# Patient Record
Sex: FEMALE | Race: WHITE | Employment: FULL TIME | URBAN - METROPOLITAN AREA
[De-identification: names, ages, dates, MRNs, and addresses within clinical notes are randomized per-mention and may not be internally consistent; named-entity substitution may affect disease eponyms.]

---

## 2022-04-06 ENCOUNTER — APPOINTMENT (OUTPATIENT)
Dept: ULTRASOUND IMAGING | Age: 37
DRG: 283 | End: 2022-04-06
Attending: EMERGENCY MEDICINE
Payer: MEDICAID

## 2022-04-06 ENCOUNTER — HOSPITAL ENCOUNTER (INPATIENT)
Age: 37
LOS: 2 days | Discharge: HOME | DRG: 283 | End: 2022-04-08
Attending: EMERGENCY MEDICINE | Admitting: FAMILY MEDICINE
Payer: MEDICAID

## 2022-04-06 DIAGNOSIS — R74.01 TRANSAMINITIS: Primary | ICD-10-CM

## 2022-04-06 DIAGNOSIS — F11.93 HEROIN WITHDRAWAL (HCC): ICD-10-CM

## 2022-04-06 DIAGNOSIS — R17 ELEVATED BILIRUBIN: ICD-10-CM

## 2022-04-06 DIAGNOSIS — R10.84 ABDOMINAL PAIN, GENERALIZED: ICD-10-CM

## 2022-04-06 DIAGNOSIS — F19.90 IV DRUG USER: ICD-10-CM

## 2022-04-06 PROBLEM — R10.9 ABDOMINAL PAIN: Status: ACTIVE | Noted: 2022-04-06

## 2022-04-06 LAB
ALBUMIN SERPL-MCNC: 3 G/DL (ref 3.5–5)
ALBUMIN/GLOB SERPL: 0.6 {RATIO} (ref 1.1–2.2)
ALP SERPL-CCNC: 275 U/L (ref 45–117)
ALT SERPL-CCNC: 1889 U/L (ref 12–78)
ANION GAP SERPL CALC-SCNC: 5 MMOL/L (ref 5–15)
APPEARANCE UR: ABNORMAL
AST SERPL-CCNC: 1450 U/L (ref 15–37)
BACTERIA URNS QL MICRO: ABNORMAL /HPF
BASOPHILS # BLD: 0 K/UL (ref 0–0.1)
BASOPHILS NFR BLD: 0 % (ref 0–1)
BILIRUB SERPL-MCNC: 6.5 MG/DL (ref 0.2–1)
BILIRUB UR QL CFM: POSITIVE
BUN SERPL-MCNC: 8 MG/DL (ref 6–20)
BUN/CREAT SERPL: 10 (ref 12–20)
CALCIUM SERPL-MCNC: 8.9 MG/DL (ref 8.5–10.1)
CAOX CRY URNS QL MICRO: ABNORMAL
CHLORIDE SERPL-SCNC: 107 MMOL/L (ref 97–108)
CO2 SERPL-SCNC: 27 MMOL/L (ref 21–32)
COLOR UR: ABNORMAL
CREAT SERPL-MCNC: 0.8 MG/DL (ref 0.55–1.02)
DIFFERENTIAL METHOD BLD: ABNORMAL
EOSINOPHIL # BLD: 0 K/UL (ref 0–0.4)
EOSINOPHIL NFR BLD: 0 % (ref 0–7)
EPITH CASTS URNS QL MICRO: ABNORMAL /LPF
ERYTHROCYTE [DISTWIDTH] IN BLOOD BY AUTOMATED COUNT: 16.5 % (ref 11.5–14.5)
GLOBULIN SER CALC-MCNC: 4.9 G/DL (ref 2–4)
GLUCOSE SERPL-MCNC: 137 MG/DL (ref 65–100)
GLUCOSE UR STRIP.AUTO-MCNC: NEGATIVE MG/DL
HAV IGM SER QL: REACTIVE
HBV CORE IGM SER QL: NONREACTIVE
HBV SURFACE AG SER QL: <0.1 INDEX
HBV SURFACE AG SER QL: NEGATIVE
HCG UR QL: NEGATIVE
HCG UR QL: NEGATIVE
HCT VFR BLD AUTO: 41.8 % (ref 35–47)
HCV AB SER IA-ACNC: >11 INDEX
HCV AB SERPL QL IA: REACTIVE
HGB BLD-MCNC: 14 G/DL (ref 11.5–16)
HGB UR QL STRIP: NEGATIVE
IMM GRANULOCYTES # BLD AUTO: 0 K/UL (ref 0–0.04)
IMM GRANULOCYTES NFR BLD AUTO: 0 % (ref 0–0.5)
KETONES UR QL STRIP.AUTO: NEGATIVE MG/DL
LEUKOCYTE ESTERASE UR QL STRIP.AUTO: ABNORMAL
LIPASE SERPL-CCNC: 133 U/L (ref 73–393)
LYMPHOCYTES # BLD: 1.1 K/UL (ref 0.8–3.5)
LYMPHOCYTES NFR BLD: 15 % (ref 12–49)
MCH RBC QN AUTO: 27.1 PG (ref 26–34)
MCHC RBC AUTO-ENTMCNC: 33.5 G/DL (ref 30–36.5)
MCV RBC AUTO: 81 FL (ref 80–99)
MONOCYTES # BLD: 0.5 K/UL (ref 0–1)
MONOCYTES NFR BLD: 7 % (ref 5–13)
NEUTS SEG # BLD: 5.7 K/UL (ref 1.8–8)
NEUTS SEG NFR BLD: 78 % (ref 32–75)
NITRITE UR QL STRIP.AUTO: POSITIVE
NRBC # BLD: 0 K/UL (ref 0–0.01)
NRBC BLD-RTO: 0 PER 100 WBC
PH UR STRIP: 7 [PH] (ref 5–8)
PLATELET # BLD AUTO: 322 K/UL (ref 150–400)
PMV BLD AUTO: 10.7 FL (ref 8.9–12.9)
POTASSIUM SERPL-SCNC: 3.3 MMOL/L (ref 3.5–5.1)
PROT SERPL-MCNC: 7.9 G/DL (ref 6.4–8.2)
PROT UR STRIP-MCNC: 30 MG/DL
RBC # BLD AUTO: 5.16 M/UL (ref 3.8–5.2)
RBC #/AREA URNS HPF: ABNORMAL /HPF (ref 0–5)
SODIUM SERPL-SCNC: 139 MMOL/L (ref 136–145)
SP GR UR REFRACTOMETRY: 1.02 (ref 1–1.03)
SP1: ABNORMAL
SP2: ABNORMAL
SP3: ABNORMAL
UR CULT HOLD, URHOLD: NORMAL
UROBILINOGEN UR QL STRIP.AUTO: 1 EU/DL (ref 0.2–1)
WBC # BLD AUTO: 7.4 K/UL (ref 3.6–11)
WBC URNS QL MICRO: ABNORMAL /HPF (ref 0–4)

## 2022-04-06 PROCEDURE — 74011250637 HC RX REV CODE- 250/637: Performed by: EMERGENCY MEDICINE

## 2022-04-06 PROCEDURE — 83690 ASSAY OF LIPASE: CPT

## 2022-04-06 PROCEDURE — 94760 N-INVAS EAR/PLS OXIMETRY 1: CPT

## 2022-04-06 PROCEDURE — 65660000000 HC RM CCU STEPDOWN

## 2022-04-06 PROCEDURE — 96361 HYDRATE IV INFUSION ADD-ON: CPT

## 2022-04-06 PROCEDURE — C9113 INJ PANTOPRAZOLE SODIUM, VIA: HCPCS | Performed by: FAMILY MEDICINE

## 2022-04-06 PROCEDURE — 36415 COLL VENOUS BLD VENIPUNCTURE: CPT

## 2022-04-06 PROCEDURE — 74011250636 HC RX REV CODE- 250/636: Performed by: EMERGENCY MEDICINE

## 2022-04-06 PROCEDURE — 93005 ELECTROCARDIOGRAM TRACING: CPT

## 2022-04-06 PROCEDURE — 99285 EMERGENCY DEPT VISIT HI MDM: CPT

## 2022-04-06 PROCEDURE — 80074 ACUTE HEPATITIS PANEL: CPT

## 2022-04-06 PROCEDURE — 87086 URINE CULTURE/COLONY COUNT: CPT

## 2022-04-06 PROCEDURE — 74011000250 HC RX REV CODE- 250: Performed by: FAMILY MEDICINE

## 2022-04-06 PROCEDURE — 96360 HYDRATION IV INFUSION INIT: CPT

## 2022-04-06 PROCEDURE — 81025 URINE PREGNANCY TEST: CPT

## 2022-04-06 PROCEDURE — 76705 ECHO EXAM OF ABDOMEN: CPT

## 2022-04-06 PROCEDURE — 80053 COMPREHEN METABOLIC PANEL: CPT

## 2022-04-06 PROCEDURE — 85025 COMPLETE CBC W/AUTO DIFF WBC: CPT

## 2022-04-06 PROCEDURE — 81001 URINALYSIS AUTO W/SCOPE: CPT

## 2022-04-06 PROCEDURE — 74011250636 HC RX REV CODE- 250/636: Performed by: FAMILY MEDICINE

## 2022-04-06 RX ORDER — SODIUM CHLORIDE 0.9 % (FLUSH) 0.9 %
5-40 SYRINGE (ML) INJECTION EVERY 8 HOURS
Status: DISCONTINUED | OUTPATIENT
Start: 2022-04-06 | End: 2022-04-08 | Stop reason: HOSPADM

## 2022-04-06 RX ORDER — LANOLIN ALCOHOL/MO/W.PET/CERES
3 CREAM (GRAM) TOPICAL
Status: DISCONTINUED | OUTPATIENT
Start: 2022-04-06 | End: 2022-04-08 | Stop reason: HOSPADM

## 2022-04-06 RX ORDER — POTASSIUM CHLORIDE 14.9 MG/ML
10 INJECTION INTRAVENOUS ONCE
Status: DISPENSED | OUTPATIENT
Start: 2022-04-06 | End: 2022-04-07

## 2022-04-06 RX ORDER — SODIUM CHLORIDE 0.9 % (FLUSH) 0.9 %
10 SYRINGE (ML) INJECTION ONCE
Status: COMPLETED | OUTPATIENT
Start: 2022-04-06 | End: 2022-04-06

## 2022-04-06 RX ORDER — PANTOPRAZOLE SODIUM 40 MG/10ML
40 INJECTION, POWDER, LYOPHILIZED, FOR SOLUTION INTRAVENOUS ONCE
Status: COMPLETED | OUTPATIENT
Start: 2022-04-06 | End: 2022-04-06

## 2022-04-06 RX ORDER — DICYCLOMINE HYDROCHLORIDE 10 MG/1
10 CAPSULE ORAL
Status: COMPLETED | OUTPATIENT
Start: 2022-04-06 | End: 2022-04-06

## 2022-04-06 RX ORDER — CLONIDINE HYDROCHLORIDE 0.1 MG/1
0.1 TABLET ORAL
Status: COMPLETED | OUTPATIENT
Start: 2022-04-06 | End: 2022-04-06

## 2022-04-06 RX ORDER — SODIUM CHLORIDE 0.9 % (FLUSH) 0.9 %
5-40 SYRINGE (ML) INJECTION AS NEEDED
Status: DISCONTINUED | OUTPATIENT
Start: 2022-04-06 | End: 2022-04-08 | Stop reason: HOSPADM

## 2022-04-06 RX ORDER — SODIUM CHLORIDE 9 MG/ML
100 INJECTION, SOLUTION INTRAVENOUS CONTINUOUS
Status: DISCONTINUED | OUTPATIENT
Start: 2022-04-06 | End: 2022-04-08 | Stop reason: HOSPADM

## 2022-04-06 RX ORDER — ONDANSETRON 2 MG/ML
4 INJECTION INTRAMUSCULAR; INTRAVENOUS
Status: DISCONTINUED | OUTPATIENT
Start: 2022-04-06 | End: 2022-04-08 | Stop reason: HOSPADM

## 2022-04-06 RX ORDER — LORAZEPAM 2 MG/ML
1 INJECTION INTRAMUSCULAR
Status: DISCONTINUED | OUTPATIENT
Start: 2022-04-06 | End: 2022-04-08 | Stop reason: HOSPADM

## 2022-04-06 RX ADMIN — SODIUM CHLORIDE 1000 ML: 9 INJECTION, SOLUTION INTRAVENOUS at 12:42

## 2022-04-06 RX ADMIN — DICYCLOMINE HYDROCHLORIDE 10 MG: 10 CAPSULE ORAL at 13:41

## 2022-04-06 RX ADMIN — CEFTRIAXONE SODIUM 1 G: 1 INJECTION, POWDER, FOR SOLUTION INTRAMUSCULAR; INTRAVENOUS at 18:54

## 2022-04-06 RX ADMIN — LORAZEPAM 1 MG: 2 INJECTION INTRAMUSCULAR; INTRAVENOUS at 18:54

## 2022-04-06 RX ADMIN — PANTOPRAZOLE SODIUM 40 MG: 40 INJECTION, POWDER, FOR SOLUTION INTRAVENOUS at 18:54

## 2022-04-06 RX ADMIN — Medication 10 ML: at 18:00

## 2022-04-06 RX ADMIN — CLONIDINE HYDROCHLORIDE 0.1 MG: 0.1 TABLET ORAL at 13:41

## 2022-04-06 RX ADMIN — ONDANSETRON HYDROCHLORIDE 4 MG: 2 SOLUTION INTRAMUSCULAR; INTRAVENOUS at 19:22

## 2022-04-06 RX ADMIN — SODIUM CHLORIDE, PRESERVATIVE FREE 10 ML: 5 INJECTION INTRAVENOUS at 18:54

## 2022-04-06 RX ADMIN — SODIUM CHLORIDE 75 ML/HR: 9 INJECTION, SOLUTION INTRAVENOUS at 18:53

## 2022-04-06 NOTE — ED PROVIDER NOTES
Please note that this dictation was completed with [employer], the computer voice recognition software.  Quite often unanticipated grammatical, syntax, homophones, and other interpretive errors are inadvertently transcribed by the computer software.  Please disregard these errors.  Please excuse any errors that have escaped final proofreading. Patient is a 28-year-old female with history of IV drug abuse presented to ED requesting heroin detox. She states that she has been injecting heroin, her last use was 2 to 3 days ago. She arrived at [facility] yesterday who started Suboxone, her second dose of Suboxone this morning.  from [facility] is with patient in the ER who states that the information for [facility] inpatient patient did talk to family, but patient wanted to come here to be evaluated. She states that she has been experiencing generalized abdominal cramping, and vomiting that has worsened since she stopped using heroin, but that has been present for the past 1 week. States that she has detox before from heroin at home, has never gone to an inpatient facility. Denies alcohol use or additional drug use. History reviewed. No pertinent past medical history. History reviewed. No pertinent surgical history. History reviewed. No pertinent family history.     Social History     Socioeconomic History    Marital status: Not on file     Spouse name: Not on file    Number of children: Not on file    Years of education: Not on file    Highest education level: Not on file   Occupational History    Not on file   Tobacco Use    Smoking status: Not on file    Smokeless tobacco: Not on file   Substance and Sexual Activity    Alcohol use: Not on file    Drug use: Not on file    Sexual activity: Not on file   Other Topics Concern    Not on file   Social History Narrative    Not on file     Social Determinants of Health     Financial Resource Strain:     Difficulty of Paying Living Expenses: Not on file   Food Insecurity:     Worried About Running Out of Food in the Last Year: Not on file    Ran Out of Food in the Last Year: Not on file   Transportation Needs:     Lack of Transportation (Medical): Not on file    Lack of Transportation (Non-Medical): Not on file   Physical Activity:     Days of Exercise per Week: Not on file    Minutes of Exercise per Session: Not on file   Stress:     Feeling of Stress : Not on file   Social Connections:     Frequency of Communication with Friends and Family: Not on file    Frequency of Social Gatherings with Friends and Family: Not on file    Attends Islam Services: Not on file    Active Member of [address] [place] [address] [employer] or Organizations: Not on file    Attends Club or Organization Meetings: Not on file    Marital Status: Not on file   Intimate Partner Violence:     Fear of Current or Ex-Partner: Not on file    Emotionally Abused: Not on file    Physically Abused: Not on file    Sexually Abused: Not on file   Housing Stability:     Unable to Pay for Housing in the Last Year: Not on file    Number of [place] in the Last Year: Not on file    Unstable Housing in the Last Year: Not on file         ALLERGIES: Patient has no known allergies. Review of Systems   Constitutional: Negative for chills and fever. HENT: Negative for congestion, ear pain and sore throat. Eyes: Negative for visual disturbance. Respiratory: Negative for cough and shortness of breath. Cardiovascular: Negative for chest pain. Gastrointestinal: Positive for abdominal pain, diarrhea, nausea and vomiting. Genitourinary: Negative for dysuria and flank pain. Musculoskeletal: Negative for back pain. Skin: Negative for color change. Neurological: Negative for dizziness and headaches. Psychiatric/Behavioral: Negative for confusion.        Vitals:    04/06/22 1115   Pulse: 75   Resp: 16   Temp: 97.6 °F (36.4 °C)   SpO2: 96%            Physical Exam  Vitals and nursing note reviewed. Constitutional:       General: She is not in acute distress. Appearance: Normal appearance. She is not ill-appearing. HENT:      Head: Normocephalic and atraumatic. Eyes:      General: Vision grossly intact. Scleral icterus present. Extraocular Movements: Extraocular movements intact. Conjunctiva/sclera: Conjunctivae normal.   Neck:      Trachea: Phonation normal.   Cardiovascular:      Rate and Rhythm: Normal rate and regular rhythm. Heart sounds: Normal heart sounds. Pulmonary:      Effort: Pulmonary effort is normal.      Breath sounds: Normal breath sounds and air entry. Abdominal:      Palpations: Abdomen is soft. Tenderness: There is generalized abdominal tenderness. There is no guarding or rebound. Negative signs include Hernandez's sign and McBurney's sign. Musculoskeletal:         General: Normal range of motion. Skin:     General: Skin is warm and dry. Neurological:      General: No focal deficit present. Mental Status: She is alert and oriented to person, place, and time. Psychiatric:         Behavior: Behavior normal.          MDM  Number of Diagnoses or Management Options  Abdominal pain, generalized  Elevated bilirubin  Heroin withdrawal ([facility] [address]  IV drug user  Transaminitis  Diagnosis management comments: Patient is alert, afebrile, vital stable. Presents with abdominal cramping, nonbloody diarrhea, vomiting in the setting of acute heroin withdrawal. Denies ETOH use. Currently at [facility] facility receiving Suboxone treatment x 2 days. Abdomen soft with generalized tenderness. Slight scleral icterus present. Lab work shows significantly elevated LFTs and total bilirubin. Right upper quadrant ultrasound suggestive of hepatitis. Hepatitis panel pending, will admit for further work-up. Patient is admitted to hospital for further work-up, observation, and management. Hospitalist notified and agrees.   Attending ED provider is aware of patient and has had the opportunity to personally evaluate the patient, and agrees with admission and current plan. Patient informed of current management plan. All questions answered at this time. Will continue to monitor patient while in ED. Amount and/or Complexity of Data Reviewed  Discuss the patient with other providers: yes (Discussed patient with ED attending Jose Carlos Dick MD who agrees with current management plan. )      ED Course as of 04/06/22 1526   Wed Apr 06, 2022   1239 ED EKG interpretation:12:39 PM  Rhythm: normal sinus rhythm; and regular. Rate (approx.): 65; Axis: normal; P wave: normal; ST/T wave: no concerning ST elevations or depressions; Other findings: PVC. EKG has also been evaluated by attending ED physician. Joana Sacks, PA   [EP]   1343 METABOLIC PANEL, COMPREHENSIVE(!):    Sodium 139   Potassium 3.3(!)   Chloride 107   CO2 27   Anion gap 5   Glucose 137(!)   BUN 8   Creatinine 0.80   BUN/Creatinine ratio 10(!)   GFR est AA >60   GFR est non-AA >60   Calcium 8.9   Bilirubin, total 6.5(!)   ALT 1,889(!)   AST 1,450(!)   Alk. phosphatase 275(!)   Protein, total 7.9   Albumin 3.0(!)   Globulin 4.9(!)   A-G Ratio 0.6(!) [EP]   4504 US ABD LTD  IMPRESSION     1. Echogenic portal triads, raising a question of periportal edema, a  nonspecific finding, versus hepatitis. Correlate with clinical and laboratory  parameters. 2. Gallbladder contraction with wall thickening likely on that basis. No biliary  ductal dilatation. 3. Increased echogenicity of the right kidney suggesting medical renal  parenchymal disease. Correlate with renal/urinary history and renal function  parameters. [EP]      ED Course User Index  [EP] JIE Banegas     Perfect Serve Consult for Admission  3:26 PM    ED Room Number: R39/R39  Patient Name and age:  Iftikhar Law 39 y.o.  female  Working Diagnosis:   1. Transaminitis    2. Abdominal pain, generalized    3.  Elevated bilirubin    4. Heroin withdrawal ([facility] [address]    5. IV drug user        COVID-19 Suspicion:  no  Sepsis present:  no  Reassessment needed: no  Code Status:  Full Code  Readmission: no  Isolation Requirements:  no  Recommended Level of Care:  med/surg  Department:[facility] Adult ED - 21   Other: 71-year-old female, IV drug use, currently trying heroin. Presents with abdominal pain and vomiting. Found to have diffusely elevated LFTs concerning for acute hepatitis, ultrasound showing periportal edema.        Procedures

## 2022-04-06 NOTE — PROGRESS NOTES
Patient asking for ginger ale. Allowed patient to have small sips. Patient vomited clear emesis. Continue NPO. Zofran given per order.

## 2022-04-06 NOTE — H&P
[address] [facility] Adult  Hospitalist Group  History and Physical    Date of Service:  4/6/2022  Primary Care Provider: None  Source of information: The patient and Chart review    Chief Complaint: Abdominal Pain      History of Presenting Illness:   Jocelyn Hoffman is a 39 y.o. female who presents with abdominal pain    Patient with history of IV drug abuse, she has been at [facility], started on Suboxone, had another dose of Suboxone this morning, patient came to the ER because she was having some abdominal pain associated with nausea and poor appetite, reports that the pain has gotten worse since he stopped using her heroin, denies any other complaints or problems, patient came to the ER was found to have elevated LFTs and was requested to be admitted to the hospitalist service           REVIEW OF SYSTEMS:  A comprehensive review of systems was negative except for that written in the History of Present Illness. History reviewed. No pertinent past medical history. History reviewed. No pertinent surgical history. Prior to Admission medications    Not on File     No Known Allergies   History reviewed. No pertinent family history. Social History:       Family and social history were personally reviewed, all pertinent and relevant details are outlined as above.     Objective:     Visit Vitals  BP (!) 153/86   Pulse (!) 59   Temp 98.1 °F (36.7 °C)   Resp 15   SpO2 100%      O2 Device: None (Room air)    PHYSICAL EXAM:   General: Alert, awake, mildly distressed  HEENT: PEERL, EOMI, moist mucus membranes  Neck: Supple, no JVD, no meningeal signs  Chest: Clear to auscultation bilaterally   CVS: RRR, S1 S2 heard, no murmurs/rubs/gallops  Abd: Soft/tender to palpation diffusely/no rebound/no guarding  Ext: No clubbing, no cyanosis, no edema  Neuro/Psych: No focal neurological deficit  Cap refill: Brisk, less than 3 seconds  Pulses: 2+, symmetric in all extremities  Skin: Warm, dry, without rashes or lesions    Data Review: All diagnostic labs and studies have been reviewed. Abnormal Labs Reviewed   CBC WITH AUTOMATED DIFF - Abnormal; Notable for the following components:       Result Value    RDW 16.5 (*)     NEUTROPHILS 78 (*)     All other components within normal limits   METABOLIC PANEL, COMPREHENSIVE - Abnormal; Notable for the following components:    Potassium 3.3 (*)     Glucose 137 (*)     BUN/Creatinine ratio 10 (*)     Bilirubin, total 6.5 (*)     ALT (SGPT) 1,889 (*)     AST (SGOT) 1,450 (*)     Alk. phosphatase 275 (*)     Albumin 3.0 (*)     Globulin 4.9 (*)     A-G Ratio 0.6 (*)     All other components within normal limits   URINALYSIS W/MICROSCOPIC - Abnormal; Notable for the following components:    Appearance TURBID (*)     Protein 30 (*)     Nitrites Positive (*)     Leukocyte Esterase SMALL (*)     Epithelial cells MODERATE (*)     Bacteria 1+ (*)     CA Oxalate crystals 3+ (*)     All other components within normal limits   BILIRUBIN, CONFIRM - Abnormal; Notable for the following components:    Bilirubin UA, confirm Positive (*)     All other components within normal limits   HEPATITIS PANEL, ACUTE - Abnormal; Notable for the following components:    Hepatitis A, IgM REACTIVE (*)     Hep C virus Ab Interp. REACTIVE (*)     All other components within normal limits       All Micro Results     Procedure Component Value Units Date/Time    CULTURE, URINE [[id number]]     Order Status: Sent Specimen: Urine from Clean catch     CULTURE, URINE [[id number]]     Order Status: Canceled Specimen: Urine from Clean catch     URINE CULTURE HOLD SAMPLE [[id number]] Collected: 04/06/22 1310    Order Status: Completed Specimen: Urine from Serum Updated: 04/06/22 1316     Urine culture hold       Urine on hold in Microbiology dept for 2 days. If unpreserved urine is submitted, it cannot be used for addtional testing after 24 hours, recollection will be required.                 IMAGING:   US ABD LTD Final Result      1. Echogenic portal triads, raising a question of periportal edema, a   nonspecific finding, versus hepatitis. Correlate with clinical and laboratory   parameters. 2. Gallbladder contraction with wall thickening likely on that basis. No biliary   ductal dilatation. 3. Increased echogenicity of the right kidney suggesting medical renal   parenchymal disease. Correlate with renal/urinary history and renal function   parameters. ECG/ECHO:    Results for orders placed or performed during the hospital encounter of 04/06/22   EKG, 12 LEAD, INITIAL   Result Value Ref Range    Ventricular Rate 65 BPM    Atrial Rate 65 BPM    P-R Interval 118 ms    QRS Duration 80 ms    Q-T Interval 452 ms    QTC Calculation (Bezet) 470 ms    Calculated P Axis 74 degrees    Calculated R Axis 73 degrees    Calculated T Axis 48 degrees    Diagnosis       Sinus rhythm with sinus arrhythmia with occasional premature ventricular   complexes  Otherwise normal ECG  No previous ECGs available          Assessment:   Given the patient's current clinical presentation, there is a high level of concern for decompensation if discharged from the emergency department. Complex decision making was performed, which includes reviewing the patient's available past medical records, laboratory results, and imaging studies.     Abdominal pain  Elevated LFTs  Heroin withdrawals  UTI  Hypokalemia      Plan:   Patient will be admitted on a telemetry bed  Unclear etiology for elevated LFTs, hepatitis panel ordered, right upper quadrant ultrasound results awaited, n.p.o., GI consult, IV fluids, repeat CMP in the morning, pain control and further intervention per hospital course  Continue Suboxone, Ativan as needed, continue to monitor, monitor on telemetry, IV Zofran and reassess as needed  IV antibiotics, urine culture  Replace potassium        DIET: DIET NPO   ISOLATION PRECAUTIONS: There are currently no Active Isolations  CODE STATUS: Full Code   DVT PROPHYLAXIS: SCDs  FUNCTIONAL STATUS PRIOR TO HOSPITALIZATION: Fully active and ambulatory; able to carry on all self-care without restriction. EARLY MOBILITY ASSESSMENT: Recommend routine ambulation while hospitalized with the assistance of nursing staff  ANTICIPATED DISCHARGE: 24-48 hours. CRITICAL CARE WAS PERFORMED FOR THIS ENCOUNTER: NO.      Signed By: Jenine Gottron, MD     April 6, 2022         Please note that this dictation may have been completed with Dragon, the computer voice recognition software. Quite often unanticipated grammatical, syntax, homophones, and other interpretive errors are inadvertently transcribed by the computer software. Please disregard these errors. Please excuse any errors that have escaped final proofreading.

## 2022-04-06 NOTE — ED NOTES
Attempted to contact Alesia Javed, in charge of admissions at patient's current facility. per patient request. Alesia Javed did not answer and the mailbox was full.

## 2022-04-06 NOTE — ROUTINE PROCESS
TRANSFER - OUT REPORT:    Verbal report given to RYAN Guzmán(name) on [place]  being transferred to (unit) for routine progression of care       Report consisted of patients Situation, Background, Assessment and   Recommendations(SBAR). Information from the following report(s) SBAR, Kardex, ED Summary, [facility] and Recent Results was reviewed with the receiving nurse. Lines:   Peripheral IV 04/06/22 Right Forearm (Active)   Site Assessment Clean, dry, & intact 04/06/22 1235   Phlebitis Assessment 0 04/06/22 1235   Infiltration Assessment 0 04/06/22 1235   Dressing Status Clean, dry, & intact 04/06/22 1235   Dressing Type Transparent 04/06/22 1235   Hub Color/Line Status Blue 04/06/22 1235   Action Taken Armboard 04/06/22 1235   Alcohol Cap Used No 04/06/22 1235        Opportunity for questions and clarification was provided.       Patient transported with:   YOGASMOGA

## 2022-04-07 ENCOUNTER — APPOINTMENT (OUTPATIENT)
Dept: NON INVASIVE DIAGNOSTICS | Age: 37
DRG: 283 | End: 2022-04-07
Attending: HOSPITALIST
Payer: MEDICAID

## 2022-04-07 LAB
ALBUMIN SERPL-MCNC: 2.7 G/DL (ref 3.5–5)
ALBUMIN/GLOB SERPL: 0.8 {RATIO} (ref 1.1–2.2)
ALP SERPL-CCNC: 230 U/L (ref 45–117)
ALT SERPL-CCNC: 1357 U/L (ref 12–78)
AMMONIA PLAS-SCNC: 24 UMOL/L
AMPHET UR QL SCN: NEGATIVE
ANION GAP SERPL CALC-SCNC: 6 MMOL/L (ref 5–15)
AST SERPL-CCNC: 871 U/L (ref 15–37)
ATRIAL RATE: 65 BPM
BACTERIA SPEC CULT: NORMAL
BARBITURATES UR QL SCN: NEGATIVE
BENZODIAZ UR QL: NEGATIVE
BILIRUB DIRECT SERPL-MCNC: 5.8 MG/DL (ref 0–0.2)
BILIRUB SERPL-MCNC: 7.1 MG/DL (ref 0.2–1)
BUN SERPL-MCNC: 9 MG/DL (ref 6–20)
BUN/CREAT SERPL: 12 (ref 12–20)
CALCIUM SERPL-MCNC: 8.3 MG/DL (ref 8.5–10.1)
CALCULATED P AXIS, ECG09: 74 DEGREES
CALCULATED R AXIS, ECG10: 73 DEGREES
CALCULATED T AXIS, ECG11: 48 DEGREES
CANNABINOIDS UR QL SCN: NEGATIVE
CC UR VC: NORMAL
CHLORIDE SERPL-SCNC: 109 MMOL/L (ref 97–108)
CO2 SERPL-SCNC: 27 MMOL/L (ref 21–32)
COCAINE UR QL SCN: POSITIVE
CREAT SERPL-MCNC: 0.76 MG/DL (ref 0.55–1.02)
DIAGNOSIS, 93000: NORMAL
DRUG SCRN COMMENT,DRGCM: ABNORMAL
ECHO AO ROOT DIAM: 2.9 CM
ECHO AV PEAK GRADIENT: 11 MMHG
ECHO AV PEAK VELOCITY: 1.7 M/S
ECHO AV VELOCITY RATIO: 0.88
ECHO LA DIAMETER: 3.3 CM
ECHO LA TO AORTIC ROOT RATIO: 1.14
ECHO LV E' LATERAL VELOCITY: 13 CM/S
ECHO LV E' SEPTAL VELOCITY: 13 CM/S
ECHO LV FRACTIONAL SHORTENING: 32 % (ref 28–44)
ECHO LV INTERNAL DIMENSION DIASTOLIC: 5 CM (ref 3.9–5.3)
ECHO LV INTERNAL DIMENSION SYSTOLIC: 3.4 CM
ECHO LV IVSD: 0.8 CM (ref 0.6–0.9)
ECHO LV MASS 2D: 135.8 G (ref 67–162)
ECHO LV POSTERIOR WALL DIASTOLIC: 0.8 CM (ref 0.6–0.9)
ECHO LV RELATIVE WALL THICKNESS RATIO: 0.32
ECHO LVOT PEAK GRADIENT: 9 MMHG
ECHO LVOT PEAK VELOCITY: 1.5 M/S
ECHO MV A VELOCITY: 0.98 M/S
ECHO MV AREA PHT: 3.2 CM2
ECHO MV E DECELERATION TIME (DT): 234.1 MS
ECHO MV E VELOCITY: 1.01 M/S
ECHO MV E/A RATIO: 1.03
ECHO MV E/E' LATERAL: 7.77
ECHO MV E/E' RATIO (AVERAGED): 7.77
ECHO MV E/E' SEPTAL: 7.77
ECHO MV PRESSURE HALF TIME (PHT): 67.9 MS
ECHO RV INTERNAL DIMENSION: 3.8 CM
ECHO RV TAPSE: 3 CM (ref 1.7–?)
ECHO TV REGURGITANT MAX VELOCITY: 2.25 M/S
ECHO TV REGURGITANT PEAK GRADIENT: 20 MMHG
ERYTHROCYTE [DISTWIDTH] IN BLOOD BY AUTOMATED COUNT: 17 % (ref 11.5–14.5)
GLOBULIN SER CALC-MCNC: 3.5 G/DL (ref 2–4)
GLUCOSE SERPL-MCNC: 98 MG/DL (ref 65–100)
HCT VFR BLD AUTO: 37.2 % (ref 35–47)
HGB BLD-MCNC: 12.3 G/DL (ref 11.5–16)
MCH RBC QN AUTO: 26.9 PG (ref 26–34)
MCHC RBC AUTO-ENTMCNC: 33.1 G/DL (ref 30–36.5)
MCV RBC AUTO: 81.4 FL (ref 80–99)
METHADONE UR QL: NEGATIVE
NRBC # BLD: 0 K/UL (ref 0–0.01)
NRBC BLD-RTO: 0 PER 100 WBC
OPIATES UR QL: NEGATIVE
P-R INTERVAL, ECG05: 118 MS
PCP UR QL: NEGATIVE
PLATELET # BLD AUTO: 278 K/UL (ref 150–400)
PMV BLD AUTO: 10.6 FL (ref 8.9–12.9)
POTASSIUM SERPL-SCNC: 3.8 MMOL/L (ref 3.5–5.1)
PROT SERPL-MCNC: 6.2 G/DL (ref 6.4–8.2)
Q-T INTERVAL, ECG07: 452 MS
QRS DURATION, ECG06: 80 MS
QTC CALCULATION (BEZET), ECG08: 470 MS
RBC # BLD AUTO: 4.57 M/UL (ref 3.8–5.2)
SERVICE CMNT-IMP: NORMAL
SODIUM SERPL-SCNC: 142 MMOL/L (ref 136–145)
VENTRICULAR RATE, ECG03: 65 BPM
WBC # BLD AUTO: 5.8 K/UL (ref 3.6–11)

## 2022-04-07 PROCEDURE — 74011250637 HC RX REV CODE- 250/637: Performed by: NURSE PRACTITIONER

## 2022-04-07 PROCEDURE — 93306 TTE W/DOPPLER COMPLETE: CPT | Performed by: SPECIALIST

## 2022-04-07 PROCEDURE — 74011250636 HC RX REV CODE- 250/636: Performed by: FAMILY MEDICINE

## 2022-04-07 PROCEDURE — 65660000000 HC RM CCU STEPDOWN

## 2022-04-07 PROCEDURE — 74011000250 HC RX REV CODE- 250: Performed by: FAMILY MEDICINE

## 2022-04-07 PROCEDURE — 74011250636 HC RX REV CODE- 250/636: Performed by: HOSPITALIST

## 2022-04-07 PROCEDURE — 85027 COMPLETE CBC AUTOMATED: CPT

## 2022-04-07 PROCEDURE — 36415 COLL VENOUS BLD VENIPUNCTURE: CPT

## 2022-04-07 PROCEDURE — 87040 BLOOD CULTURE FOR BACTERIA: CPT

## 2022-04-07 PROCEDURE — 94760 N-INVAS EAR/PLS OXIMETRY 1: CPT

## 2022-04-07 PROCEDURE — 82140 ASSAY OF AMMONIA: CPT

## 2022-04-07 PROCEDURE — 80048 BASIC METABOLIC PNL TOTAL CA: CPT

## 2022-04-07 PROCEDURE — 74011250637 HC RX REV CODE- 250/637: Performed by: HOSPITALIST

## 2022-04-07 PROCEDURE — 80076 HEPATIC FUNCTION PANEL: CPT

## 2022-04-07 PROCEDURE — 93306 TTE W/DOPPLER COMPLETE: CPT

## 2022-04-07 PROCEDURE — 80307 DRUG TEST PRSMV CHEM ANLYZR: CPT

## 2022-04-07 RX ORDER — HYDROXYZINE HYDROCHLORIDE 10 MG/1
25 TABLET, FILM COATED ORAL
Status: DISCONTINUED | OUTPATIENT
Start: 2022-04-07 | End: 2022-04-07

## 2022-04-07 RX ORDER — IBUPROFEN 200 MG
1 TABLET ORAL DAILY
Status: DISCONTINUED | OUTPATIENT
Start: 2022-04-07 | End: 2022-04-08 | Stop reason: HOSPADM

## 2022-04-07 RX ORDER — LORAZEPAM 2 MG/ML
1 INJECTION INTRAMUSCULAR ONCE
Status: COMPLETED | OUTPATIENT
Start: 2022-04-08 | End: 2022-04-08

## 2022-04-07 RX ORDER — BUPRENORPHINE HYDROCHLORIDE AND NALOXONE HYDROCHLORIDE DIHYDRATE 8; 2 MG/1; MG/1
1 TABLET SUBLINGUAL DAILY
Status: DISCONTINUED | OUTPATIENT
Start: 2022-04-08 | End: 2022-04-07

## 2022-04-07 RX ORDER — HYDROXYZINE HYDROCHLORIDE 10 MG/1
25 TABLET, FILM COATED ORAL
Status: DISCONTINUED | OUTPATIENT
Start: 2022-04-07 | End: 2022-04-08 | Stop reason: HOSPADM

## 2022-04-07 RX ORDER — NALOXONE HYDROCHLORIDE 0.4 MG/ML
0.4 INJECTION, SOLUTION INTRAMUSCULAR; INTRAVENOUS; SUBCUTANEOUS AS NEEDED
Status: DISCONTINUED | OUTPATIENT
Start: 2022-04-07 | End: 2022-04-08 | Stop reason: HOSPADM

## 2022-04-07 RX ORDER — BUPRENORPHINE HYDROCHLORIDE AND NALOXONE HYDROCHLORIDE DIHYDRATE 8; 2 MG/1; MG/1
1 TABLET SUBLINGUAL DAILY
Status: DISCONTINUED | OUTPATIENT
Start: 2022-04-07 | End: 2022-04-08 | Stop reason: HOSPADM

## 2022-04-07 RX ADMIN — CEFTRIAXONE SODIUM 1 G: 1 INJECTION, POWDER, FOR SOLUTION INTRAMUSCULAR; INTRAVENOUS at 17:34

## 2022-04-07 RX ADMIN — SODIUM CHLORIDE 75 ML/HR: 9 INJECTION, SOLUTION INTRAVENOUS at 08:32

## 2022-04-07 RX ADMIN — SODIUM CHLORIDE 100 ML/HR: 9 INJECTION, SOLUTION INTRAVENOUS at 21:57

## 2022-04-07 RX ADMIN — SODIUM CHLORIDE, PRESERVATIVE FREE 10 ML: 5 INJECTION INTRAVENOUS at 06:44

## 2022-04-07 RX ADMIN — Medication 3 MG: at 21:57

## 2022-04-07 RX ADMIN — SODIUM CHLORIDE, PRESERVATIVE FREE 10 ML: 5 INJECTION INTRAVENOUS at 21:57

## 2022-04-07 RX ADMIN — SODIUM CHLORIDE, PRESERVATIVE FREE 10 ML: 5 INJECTION INTRAVENOUS at 18:13

## 2022-04-07 RX ADMIN — BUPRENORPHINE HYDROCHLORIDE AND NALOXONE HYDROCHLORIDE DIHYDRATE 1 TABLET: 8; 2 TABLET SUBLINGUAL at 15:02

## 2022-04-07 RX ADMIN — LORAZEPAM 1 MG: 2 INJECTION INTRAMUSCULAR; INTRAVENOUS at 21:57

## 2022-04-07 RX ADMIN — LORAZEPAM 1 MG: 2 INJECTION INTRAMUSCULAR; INTRAVENOUS at 08:31

## 2022-04-07 RX ADMIN — HYDROXYZINE HYDROCHLORIDE 25 MG: 10 TABLET ORAL at 18:13

## 2022-04-07 RX ADMIN — LORAZEPAM 1 MG: 2 INJECTION INTRAMUSCULAR; INTRAVENOUS at 13:50

## 2022-04-07 NOTE — PROGRESS NOTES
Patient very drowsy throughout the shift and hard to arouse at times. This RN expressed concern to patient about her using recreational drugs while at the hospital due to being so hard to arouse at times, to which patient denied. This RN smelled cigarette smoke in patients bathroom multiple times throughout shift and reiterated to patient the dangers of smoking around oxygen, to which patient also denied. This RN saw cigarettes and a lighter in the bathroom, lighter placed in cabinet at bedside. Patient also noted to be unhooking her own IV fluids to go to the bathroom and leaving line running on the floor, stating that \"someone on the last shift told her how to do it\". This RN explained to patient that she should not be messing with her IV and to ring her call bell to be unhooked before getting up. This RN also explained to patient that her doing this opens her up to infections by using tubing that has been on the floor. Patient medicated with ativan and atarax as needed during shift.

## 2022-04-07 NOTE — CONSULTS
Comprehensive Nutrition Assessment    Type and Reason for Visit: Initial,Consult    Nutrition Recommendations/Plan:      1. Continue with GI bland diet    2. RD will order Ensure Enlive (high calorie, high protein) TID, Boost Pudding and Magic Cup 1x/day each. 3. Recommend ordering daily MVI    4. Please obtain scale weight      Nutrition Assessment:    38 yo female admitted for ABD pain. PMhx: IV drug abuse, Hepatitis A.  MD consult receive for weight loss. No height or weight charted. Pt stated her height is 5'5\" and guesses she weighs about 100 lbs now but states she is \"afraid to know\" exactly how much she weighs. Reports UBW is 155 lbs, last weighing that 6 months ago. This indicates 35% weight loss x 6 months which is significant for time frame. Pt with severe muscle and fat loss per visual assessment. States she has not been eating much at all these last 6 months. Admitted for detox of heroin. First meal since admission was breakfast today, she ate < 25%, states she did not like it. Asking for vitamins, will recommend MD order daily MVI. Discussed ONS options, pt would like vanilla Ensure with meals, also willing to try Boost Pudding and Magic Cup. Provided education on high calorie intake to promote weight gain. Labs: , ALT 1357      Malnutrition Assessment:  Malnutrition Status:  Severe malnutrition    Context:  Chronic illness     Findings of the 6 clinical characteristics of malnutrition:   Energy Intake:  7 - 75% or less est energy requirements for 1 month or longer  Weight Loss:  7.00 - Greater than 10% over 6 months     Body Fat Loss:  7 - Severe body fat loss, Buccal region,Orbital   Muscle Mass Loss:  7 - Severe muscle mass loss, Temples (temporalis)  Fluid Accumulation:  No significant fluid accumulation,     Strength:  Not performed       Nutritionally Significant Medications: Kcl, Zofran;  NaCl at 75 ml/hr    Estimated Daily Nutrient Needs:  Energy (kcal): 1811-2875 (35-40 kcals/kg); Weight Used for Energy Requirements: Current  Protein (g): 68 (1.5 gm/kg); Weight Used for Protein Requirements: Current  Fluid (ml/day): 7039-8266; Method Used for Fluid Requirements: 1 ml/kcal    Nutrition Related Findings:       BM: PTA  Edema: none  Wounds:  None       Current Nutrition Therapies:   Diet: [place]  Supplements: none  Additional Caloric Sources: none    Meal intake: No data found. Supplement Intake: No data found.     Anthropometric Measures:  · Height:  5' 5\" (165.1 cm)  · Current Body Wt:  45.4 kg (100 lb 1.4 oz)   · Admission Body Wt:       · Usual Body Wt:        · Ideal Body Wt:  125:  80.1 %   · Adjusted Body Weight:   ; Weight Adjustment for: No adjustment   · Adjusted BMI:       · BMI Categories:  Underweight (BMI less than 18.5)     Wt Readings from Last 10 Encounters:   No data found for Wt       Nutrition Diagnosis:   · Severe malnutrition related to inadequate protein-energy intake as evidenced by weight loss,severe loss of subcutaneous fat,severe muscle loss,intake 0-25%      Nutrition Interventions:   Food and/or Nutrient Delivery: Continue current diet,Start oral nutrition supplement  Nutrition Education and Counseling: No recommendations at this time  Coordination of Nutrition Care: Continue to monitor while inpatient    Goals:  PO intakes > 75% meals + ONS to promote weight gain of 0.5-1lb within next 5-7 days       Nutrition Monitoring and Evaluation:   Behavioral-Environmental Outcomes: None identified  Food/Nutrient Intake Outcomes: Food and nutrient intake,Supplement intake  Physical Signs/Symptoms Outcomes: Biochemical data,GI status,Weight    Discharge Planning:    Continue current diet,Continue oral nutrition supplement     Valentino Cat RD  Contact via docBeat

## 2022-04-07 NOTE — PROGRESS NOTES
CM attempted to speak with the patient via room phone, continues to ring. Call placed to patient's mobile [phone] person who answered stated they are the patient's bf (no emergency or family listed for patient) and that they share a phone and the patient does not currently have a phone at this time.        1:56 PM  RICCARDO Mcdermott

## 2022-04-07 NOTE — CONSULTS
118 [facility].  217 [facility] 140 [facility], [address]  [phone]                     GI CONSULTATION NOTE      NAME:  Rebecca Hollins   :   1985   MRN:   [id number]     Consult Date: 2022     Chief Complaint: Abd pain    History of Present Illness:  Patient is a 39 y.o. who is seen in consultation at the request of Dr. Yulissa España for the above mentioned problem. Ms. Amna Ruth has a past medical history of IV drug abuse (heroin). She presented to [facility] requested heroin detox assistance, she last injected heroin 3 days ago. She is currently getting Suboxone from the [facility], first two doses were yesterday. Reports having abdominal cramping pain, RUQ tenderness and nausea that started a week ago but has worsened since she stopped using heroin. US ABD revealed echogenic portal triads. Labs in ED significant for TB 6.5, ALT, 1889, AST 1450, . Reactive Hepatitis A IgM. She denies sharing needles however, states she often shares the water to mix the drugs with other users. Additionally, she had shellfish and oysters on a trip to [place] last month. Reports her appetite has significantly declined since she has been \"using\" heroin and has lost ~ 40lbs. Today, nausea has improved. Having frequent loose grey colored stools. Would like to have a sandwich. PMH:  History reviewed. No pertinent past medical history. PSH:  History reviewed. No pertinent surgical history.     Allergies:  No Known Allergies    Home Medications:  None       Hospital Medications:  Current Facility-Administered Medications   Medication Dose Route Frequency    [START ON 2022] buprenorphine-naloxone (SUBOXONE) 8-2mg SL tablet  1 Tablet SubLINGual DAILY    naloxone (NARCAN) injection 0.4 mg  0.4 mg IntraVENous PRN    sodium chloride (NS) flush 5-40 mL  5-40 mL IntraVENous Q8H    sodium chloride (NS) flush 5-40 mL  5-40 mL IntraVENous PRN    0.9% sodium chloride infusion  100 mL/hr IntraVENous CONTINUOUS    ondansetron (ZOFRAN) injection 4 mg  4 mg IntraVENous Q4H PRN    cefTRIAXone (ROCEPHIN) 1 g in sterile water (preservative free) 10 mL IV syringe  1 g IntraVENous Q24H    LORazepam (ATIVAN) injection 1 mg  1 mg IntraVENous Q8H PRN    melatonin tablet 3 mg  3 mg Oral QHS PRN       Social History:  Social History     Tobacco Use    Smoking status: Not on file    Smokeless tobacco: Not on file   Substance Use Topics    Alcohol use: Not on file       Family History:  History reviewed. No pertinent family history. Review of Systems:    Constitutional: negative fever, negative chills, + weight loss  Eyes:   negative visual changes  ENT:   negative sore throat, tongue or lip swelling  Respiratory:  negative cough, negative dyspnea  Cards:  negative for chest pain, palpitations, lower extremity edema  GI:   See HPI  :  negative for frequency, dysuria  Integument:  negative for rash and pruritus  Heme:  negative for easy bruising and gum/nose bleeding  Musculoskel: negative for myalgias,  back pain and muscle weakness  Neuro: negative for headaches, dizziness, vertigo  Psych:  negative for feelings of anxiety, depression      Objective:     Patient Vitals for the past 8 hrs:   BP Temp Pulse Resp SpO2 Height   04/07/22 1248 -- -- -- -- 94 % --   04/07/22 1232 -- -- (!) 56 -- -- --   04/07/22 1136 (!) 183/88 98.9 °F (37.2 °C) (!) 46 14 95 % --   04/07/22 1135 -- -- -- -- -- 5' 5\" (1.651 m)   04/07/22 1008 -- -- (!) 56 -- -- --   04/07/22 0825 (!) 154/94 97.8 °F (36.6 °C) (!) 59 20 96 % --   04/07/22 0800 -- -- (!) 57 -- 95 % --   04/07/22 0600 -- -- (!) 54 -- -- --     04/07 0701 - 04/07 1900  In: 118 [P.O.:118]  Out: -   04/05 1901 - 04/07 0700  In: 683.8 [I.V.:683.8]  Out: -       PHYSICAL EXAM:  General appearance: ill appearing female  Skin: jaundice    HEENT: Sclerae icteric. Cardiovascular: bradycardiac, No murmurs, gallops, or rubs.    Respiratory:  Clear breath sounds with no wheezes, rales, or rhonchi. GI: Abdomen nondistended, soft, and RUQ tenderness, Normal active bowel sounds. Rectal: Deferred   Musculoskeletal: No pitting edema of the lower legs. .   Neurological:  Patient is alert and oriented. Psychiatric:   No anxiety or agitation. Data Review     Recent Labs     04/06/22  1226   WBC 7.4   HGB 14.0   HCT 41.8        Recent Labs     04/07/22  0608 04/06/22  1226    139   K 3.8 3.3*   * 107   CO2 27 27   BUN 9 8   CREA 0.76 0.80   GLU 98 137*   CA 8.3* 8.9     Recent Labs     04/07/22  0608 04/06/22  1226   * 275*   TP 6.2* 7.9   ALB 2.7* 3.0*   GLOB 3.5 4.9*   LPSE  --  133     No results for input(s): INR, PTP, APTT, INREXT in the last 72 hours. Imaging studies reviewed    Assessment / Plan :     Abdominal pain, RUQ  Hepatitis A  Transaminitis  Hyperbilirubinemia    40 yo female h/o IV drug abuse (heroin) recently at [facility] for detox and started on Suboxone . Presents with RUQ abdominal pain, nausea and poor appetite for the past week prior to starting Suboxone. Hepatitis A IgM reactive (4/7). Hepatitis A is usually self-limited, recommend supportive care. Full recovery is typically seen in 3-6 months. Recommend use caution with medications metabolized by the liver or known to cause liver damage. - supportive care  - GI lite diet as tolerated  - nutrition consult per patient request: ~40lb weight loss 2/2 IV drug use, hep A  - trend LFT's         GI will signoff at this time. Please call if you have questions prior to discharge. ATTENDING ADDENDUM OF ELLEN NOTE:  I saw and evaluated Shari Tomlin on the above date of service and discussed the care with the nurse practitioner. I agree with the findings and plan as documented in the note above and any changes I have noted in bold to reflect my findings and plan.   Above described patient with significant substance abuse disorder including IVDU admitted with acute RUQ pain and hepatocellular hepatic injury found to have acute hepatitis A infection. Natural history of HAV infection is spontaneous recovery with supportive care. Provide anti-emetics and advance diet as tolerated. Can discharge when clinically improved and able to tolerate PO. Will need close follow up with PCP to recheck LFTs in 1-2 weeks. GI will sign off.      Patient Active Hospital Problem List:   Active Problems:    Abdominal pain (4/6/2022)

## 2022-04-07 NOTE — PROGRESS NOTES
68[address] [facility] Adult  Hospitalist Group                                                                                          Hospitalist Progress Note  Jailyn Hill MD  Answering service: [phone] from in house phone        Date of Service:  2022  NAME:  Ria Mendez  :  1985  MRN:  [id number]      Admission Summary:     Ria Mendez is a 39 y.o. female who presents with abdominal pain. Patient with history of IV drug abuse, she has been at [facility], started on Suboxone, had another dose of Suboxone this morning, patient came to the ER because she was having some abdominal pain associated with nausea and poor appetite, reports that the pain has gotten worse since he stopped using her heroin, denies any other complaints or problems, patient came to the ER was found to have elevated LFTs and was requested to be admitted to the hospitalist service     Interval history / Subjective:     She said she has right upper quadrant abdominal pain, no vomiting,      Assessment & Plan:     Abdominal pain and Elevated liver enzyme due to acute hepatitis A   -liver enzyme improving  -US echogenic portal triads, raising a question of periportal edema, a nonspecific finding, versus hepatitis, Gallbladder contraction with wall thickening likely on that basis. No biliary  ductal dilatation.   -hepatitis A IgM reactive, hepatitis B and C negative  -monitor liver enzyme   -GI is consulted    Heroin withdrawal  -will resume home soboxone   -continue normal saline, ativan prn   -blood cx  -Echo  -consult to psychiatrist    UTI POA  -on iv ceftriaxone and IVF     Hypokalemia   -replaced and resolved          Code status: Full Code  Prophylaxis: SCD  Care Plan discussed with: Patient, nurse and CM  Anticipated Disposition: Home, >48 hr     Hospital Problems  Never Reviewed          Codes Class Noted POA    Abdominal pain ICD-10-CM: R10.9  ICD-9-CM: 789.00  2022 Unknown Vital Signs:    Last 24hrs VS reviewed since prior progress note. Most recent are:  Visit Vitals  BP (!) 183/88 (BP 1 Location: Right upper arm, BP Patient Position: At rest)   Pulse (!) 46   Temp 98.9 °F (37.2 °C)   Resp 14   Ht 5' 5\" (1.651 m)   SpO2 95%         Intake/Output Summary (Last 24 hours) at 4/7/2022 1142  Last data filed at 4/7/2022 0400  Gross per 24 hour   Intake 683.75 ml   Output --   Net 683.75 ml        Physical Examination:     I had a face to face encounter with this patient and independently examined them on 4/7/2022 as outlined below:          Constitutional:  No acute distress, cooperative, pleasant    ENT:  Oral mucosa moist, oropharynx benign. Resp:  CTA bilaterally. No wheezing/rhonchi/rales. No accessory muscle use. CV:  Regular rhythm, normal rate, no murmurs, gallops, rubs    GI:  Soft, non distended, non tender. normoactive bowel sounds, no hepatosplenomegaly     Musculoskeletal:  No edema     Neurologic:  Moves all extremities. AAOx3, CN II-XII reviewed            Data Review:    Review and/or order of clinical lab test  Review and/or order of tests in the radiology section of CPT  Review and/or order of tests in the medicine section of CPT      Labs:     Recent Labs     04/06/22  1226   WBC 7.4   HGB 14.0   HCT 41.8        Recent Labs     04/07/22  0608 04/06/22  1226    139   K 3.8 3.3*   * 107   CO2 27 27   BUN 9 8   CREA 0.76 0.80   GLU 98 137*   CA 8.3* 8.9     Recent Labs     04/07/22  0608 04/06/22  1226   ALT 1,357* 1,889*   * 275*   TBILI 7.1* 6.5*   TP 6.2* 7.9   ALB 2.7* 3.0*   GLOB 3.5 4.9*   LPSE  --  133     No results for input(s): INR, PTP, APTT, INREXT in the last 72 hours. No results for input(s): FE, TIBC, PSAT, FERR in the last 72 hours. No results found for: FOL, RBCF   No results for input(s): PH, PCO2, PO2 in the last 72 hours. No results for input(s): CPK, CKNDX, TROIQ in the last 72 hours.     No lab exists for component: CPKMB  No results found for: CHOL, CHOLX, CHLST, CHOLV, HDL, HDLP, LDL, LDLC, DLDLP, TGLX, TRIGL, TRIGP, CHHD, CHHDX  No results found for: GLUCPOC  Lab Results   Component Value Date/Time    Color DARK YELLOW 04/06/2022 01:10 PM    Appearance TURBID (A) 04/06/2022 01:10 PM    Specific gravity 1.018 04/06/2022 01:10 PM    pH (UA) 7.0 04/06/2022 01:10 PM    Protein 30 (A) 04/06/2022 01:10 PM    Glucose Negative 04/06/2022 01:10 PM    Ketone Negative 04/06/2022 01:10 PM    Urobilinogen 1.0 04/06/2022 01:10 PM    Nitrites Positive (A) 04/06/2022 01:10 PM    Leukocyte Esterase SMALL (A) 04/06/2022 01:10 PM    Epithelial cells MODERATE (A) 04/06/2022 01:10 PM    Bacteria 1+ (A) 04/06/2022 01:10 PM    WBC 0-4 04/06/2022 01:10 PM    RBC 0-5 04/06/2022 01:10 PM         Medications Reviewed:     Current Facility-Administered Medications   Medication Dose Route Frequency    sodium chloride (NS) flush 5-40 mL  5-40 mL IntraVENous Q8H    sodium chloride (NS) flush 5-40 mL  5-40 mL IntraVENous PRN    0.9% sodium chloride infusion  75 mL/hr IntraVENous CONTINUOUS    ondansetron (ZOFRAN) injection 4 mg  4 mg IntraVENous Q4H PRN    cefTRIAXone (ROCEPHIN) 1 g in sterile water (preservative free) 10 mL IV syringe  1 g IntraVENous Q24H    LORazepam (ATIVAN) injection 1 mg  1 mg IntraVENous Q8H PRN    melatonin tablet 3 mg  3 mg Oral QHS PRN     ______________________________________________________________________  EXPECTED LENGTH OF STAY: - - -  ACTUAL LENGTH OF STAY:          1                 Lisa Benitez MD

## 2022-04-07 NOTE — PROGRESS NOTES
Pt c/o anxiety, she has Ativan ordered but HR fluctuates between 40-60s. Also, pt is requesting suboxone for withdrawal symptoms. She is requesting to speak with the physician. RN is contacting MD for further orders and will ctm.

## 2022-04-07 NOTE — PROGRESS NOTES
Reason for Admission:  Abdominal pain, history of Hepatitis A and IV Drug use, Heroin                     RUR Score:    8% Low                 Plan for utilizing home health:   No needs      PCP: First and Last name:  None     Name of Practice:    Are you a current patient: Yes/No:    Approximate date of last visit:    Can you participate in a virtual visit with your PCP:                     Current Advanced Directive/Advance Care Plan: Full Code      Healthcare Decision Maker:  NA  Click here to complete [address] including selection of the Healthcare Decision Maker Relationship (ie \"Primary\")                             Transition of Care Plan:  CM met with patient to inform of CM role and to assess needs. Patient is drowsy but did confirm her address listed on file. She is from [place] but reports she and her 20 yo daughter Libertad Santa drove to [place] for patient to admit inpatient substance abuse treatment at the [facility]. Patient does not have insurance and is not employed. She states that her daughter is her main support and works. Patient was not clear on where her daughter is staying here in [place] but states the two drove to [place] a couple of days ago. Patient does not have a personal cell, number on file reaches her boyfriend who states that the two share a phone. Patient does not know her daughter's number but states that her daughter knows she is here in the hospital. CM to monitor.      Jose Alejandro Mays MS

## 2022-04-07 NOTE — PROGRESS NOTES
Pt's heart rate dropped to 38 (non-sustained), and is now in the 40s. Pt's VS remain WNL. Pt states that she is starting to have withdrawal symptoms. When RN walked into the room the pt was asleep. RN has notified MD bela car.

## 2022-04-08 VITALS
TEMPERATURE: 97.7 F | HEART RATE: 86 BPM | DIASTOLIC BLOOD PRESSURE: 103 MMHG | SYSTOLIC BLOOD PRESSURE: 158 MMHG | RESPIRATION RATE: 32 BRPM | BODY MASS INDEX: 18.54 KG/M2 | WEIGHT: 111.3 LBS | OXYGEN SATURATION: 95 % | HEIGHT: 65 IN

## 2022-04-08 LAB
ALBUMIN SERPL-MCNC: 2.9 G/DL (ref 3.5–5)
ALBUMIN/GLOB SERPL: 0.8 {RATIO} (ref 1.1–2.2)
ALP SERPL-CCNC: 291 U/L (ref 45–117)
ALT SERPL-CCNC: 1018 U/L (ref 12–78)
ANION GAP SERPL CALC-SCNC: 5 MMOL/L (ref 5–15)
AST SERPL-CCNC: 377 U/L (ref 15–37)
BILIRUB SERPL-MCNC: 7.2 MG/DL (ref 0.2–1)
BUN SERPL-MCNC: 10 MG/DL (ref 6–20)
BUN/CREAT SERPL: 13 (ref 12–20)
CALCIUM SERPL-MCNC: 8.5 MG/DL (ref 8.5–10.1)
CHLORIDE SERPL-SCNC: 107 MMOL/L (ref 97–108)
CO2 SERPL-SCNC: 27 MMOL/L (ref 21–32)
CREAT SERPL-MCNC: 0.75 MG/DL (ref 0.55–1.02)
ERYTHROCYTE [DISTWIDTH] IN BLOOD BY AUTOMATED COUNT: 17 % (ref 11.5–14.5)
GLOBULIN SER CALC-MCNC: 3.7 G/DL (ref 2–4)
GLUCOSE SERPL-MCNC: 122 MG/DL (ref 65–100)
HCT VFR BLD AUTO: 38.4 % (ref 35–47)
HGB BLD-MCNC: 12.6 G/DL (ref 11.5–16)
MCH RBC QN AUTO: 27.1 PG (ref 26–34)
MCHC RBC AUTO-ENTMCNC: 32.8 G/DL (ref 30–36.5)
MCV RBC AUTO: 82.6 FL (ref 80–99)
NRBC # BLD: 0 K/UL (ref 0–0.01)
NRBC BLD-RTO: 0 PER 100 WBC
PLATELET # BLD AUTO: 283 K/UL (ref 150–400)
PMV BLD AUTO: 10.1 FL (ref 8.9–12.9)
POTASSIUM SERPL-SCNC: 3.4 MMOL/L (ref 3.5–5.1)
PROT SERPL-MCNC: 6.6 G/DL (ref 6.4–8.2)
RBC # BLD AUTO: 4.65 M/UL (ref 3.8–5.2)
SODIUM SERPL-SCNC: 139 MMOL/L (ref 136–145)
WBC # BLD AUTO: 6.1 K/UL (ref 3.6–11)

## 2022-04-08 PROCEDURE — 74011000250 HC RX REV CODE- 250: Performed by: FAMILY MEDICINE

## 2022-04-08 PROCEDURE — 74011250636 HC RX REV CODE- 250/636: Performed by: HOSPITALIST

## 2022-04-08 PROCEDURE — 74011250636 HC RX REV CODE- 250/636: Performed by: FAMILY MEDICINE

## 2022-04-08 PROCEDURE — 74011250637 HC RX REV CODE- 250/637: Performed by: HOSPITALIST

## 2022-04-08 PROCEDURE — 80053 COMPREHEN METABOLIC PANEL: CPT

## 2022-04-08 PROCEDURE — 85027 COMPLETE CBC AUTOMATED: CPT

## 2022-04-08 PROCEDURE — 74011250636 HC RX REV CODE- 250/636: Performed by: NURSE PRACTITIONER

## 2022-04-08 PROCEDURE — 36415 COLL VENOUS BLD VENIPUNCTURE: CPT

## 2022-04-08 RX ORDER — HYDROXYZINE 25 MG/1
25 TABLET, FILM COATED ORAL
Qty: 30 TABLET | Refills: 0 | Status: SHIPPED | OUTPATIENT
Start: 2022-04-08 | End: 2022-04-18

## 2022-04-08 RX ORDER — POTASSIUM CHLORIDE 750 MG/1
40 TABLET, FILM COATED, EXTENDED RELEASE ORAL
Status: COMPLETED | OUTPATIENT
Start: 2022-04-08 | End: 2022-04-08

## 2022-04-08 RX ADMIN — SODIUM CHLORIDE 100 ML/HR: 9 INJECTION, SOLUTION INTRAVENOUS at 14:32

## 2022-04-08 RX ADMIN — POTASSIUM CHLORIDE 40 MEQ: 750 TABLET, EXTENDED RELEASE ORAL at 08:45

## 2022-04-08 RX ADMIN — BUPRENORPHINE HYDROCHLORIDE AND NALOXONE HYDROCHLORIDE DIHYDRATE 1 TABLET: 8; 2 TABLET SUBLINGUAL at 08:45

## 2022-04-08 RX ADMIN — LORAZEPAM 1 MG: 2 INJECTION INTRAMUSCULAR; INTRAVENOUS at 00:01

## 2022-04-08 RX ADMIN — LORAZEPAM 1 MG: 2 INJECTION INTRAMUSCULAR; INTRAVENOUS at 06:30

## 2022-04-08 RX ADMIN — HYDROXYZINE HYDROCHLORIDE 25 MG: 10 TABLET ORAL at 06:30

## 2022-04-08 RX ADMIN — SODIUM CHLORIDE, PRESERVATIVE FREE 10 ML: 5 INJECTION INTRAVENOUS at 14:32

## 2022-04-08 RX ADMIN — LORAZEPAM 1 MG: 2 INJECTION INTRAMUSCULAR; INTRAVENOUS at 14:32

## 2022-04-08 NOTE — PROGRESS NOTES
Bedside shift change report given to Pham Garcia RN (oncoming nurse) by Sharyon Lennox, RN (offgoing nurse). Report included the following information SBAR, Kardex, Intake/Output, MAR, Recent Results, Cardiac Rhythm . and Alarm Parameters . RN informed about patient constantly disconnecting self from Iv and disarming alarm, and elopement episode.

## 2022-04-08 NOTE — DISCHARGE SUMMARY
Anesthesia Post Evaluation    Patient: Juanita Ibarra    Procedure(s) Performed: Procedure(s) (LRB):  BRONCHOSCOPY (Bilateral)    Final Anesthesia Type: general  Patient location during evaluation: PACU  Patient participation: Yes- Able to Participate  Level of consciousness: awake and alert  Pain management: adequate  Airway patency: patent  PONV status at discharge: No PONV  Anesthetic complications: no      Cardiovascular status: blood pressure returned to baseline  Respiratory status: unassisted, spontaneous ventilation and room air  Hydration status: euvolemic  Follow-up not needed.        Visit Vitals  /73 (BP Location: Right arm, Patient Position: Sitting)   Pulse 82   Temp 36.7 °C (98.1 °F) (Oral)   Resp 18   Ht 5' (1.524 m)   Wt 56 kg (123 lb 7.3 oz)   LMP 10/02/2016   SpO2 97%   Breastfeeding? No   BMI 24.11 kg/m²       Pain/Richard Score: Pain Rating Prior to Med Admin: 7 (12/12/2018 11:35 AM)  Pain Rating Post Med Admin: 4 (12/12/2018 12:29 PM)  Richard Score: 8 (12/11/2018  2:14 PM)         Discharge Summary       PATIENT ID: Uriah Viramontes  MRN: [id number]   YOB: 1985    DATE OF ADMISSION: 4/6/2022 12:25 PM    DATE OF DISCHARGE: 4/8/2022   PRIMARY CARE PROVIDER: None     ATTENDING PHYSICIAN: Jong Kurtz MD   DISCHARGING PROVIDER: Stephanie Gonzalez MD    To contact this individual call [phone] and ask the  to page. If unavailable ask to be transferred the Adult Hospitalist Department. CONSULTATIONS: IP CONSULT TO GASTROENTEROLOGY  IP CONSULT TO PSYCHIATRY    PROCEDURES/SURGERIES: * No surgery found *      ADMISSION SUMMARY AND HOSPITAL COURSE:     John Rivers a 39 y.o. female who presents with abdominal pain. Patient with history of IV drug abuse, she has been at [email or web address], started on Suboxone, had another dose of Suboxone this morning, patient came to the ER because she was having some abdominal pain associated with nausea and poor appetite, reports that the pain has gotten worse since he stopped using her heroin, denies any other complaints or problems, patient came to the ER was found to have elevated LFTs and was requested to be admitted to the hospitalist service     Abdominal pain and Elevated liver enzyme due to acute hepatitis A   -liver enzyme improving, bilirubin trending up  -US echogenic portal triads, raising a question of periportal edema, a nonspecific finding, versus hepatitis, Gallbladder contraction with wall thickening likely on that basis. No biliary  ductal dilatation.   -hepatitis A IgM reactive, hepatitis B and C negative  -monitor liver enzyme   -GI is consulted  Heroin withdrawal  -will resume home soboxone   -advised to stop heroin  -continue normal saline, ativan prn   -blood cx no growth  -seen by psychiatrist  UTI POA  -on iv ceftriaxone and IVF   -urine cx mixed stanislaw  Hypokalemia   -replaced and resolved         Code status: Full Code  Prophylaxis: SCD      DISCHARGE DIAGNOSES / PLAN:      Abdominal pain and Elevated liver enzyme due to acute hepatitis A   -liver enzyme improving, bilirubin trending up  -hepatitis A IgM reactive, hepatitis B and C negative  -abdominal pain  -outpat  Heroin withdrawal  -resume home soboxone   -advised to stop heroin  -outpatient follow up with psychiatrist  UTI POA  -completed iv ceftriaxone   -urine cx grow mixed stanislaw  Hypokalemia   -resolved       NOTIFY YOUR PHYSICIAN FOR ANY OF THE FOLLOWING:   Fever over 101 degrees for 24 hours. Chest pain, shortness of breath, fever, chills, nausea, vomiting, diarrhea, change in mentation, falling, weakness, bleeding. Severe pain or pain not relieved by medications, as well as any other signs or symptoms that you may have questions about. FOLLOW UP APPOINTMENTS:    Follow-up Information     Follow up With Specialties Details Why Contact Info   1. Primary Care Physician   In one week  None (395) Patient stated that they have no PCP        2. Advised to go to [facility]  3. Samia Chandra MD, Gastroenterologist in 2 weeks    DIET: Regular Diet    ACTIVITY: Activity as tolerated    EQUIPMENT needed: None    DISCHARGE MEDICATIONS:  Discharge Medication List as of 4/8/2022  5:22 PM      START taking these medications    Details   hydrOXYzine HCL (ATARAX) 25 mg tablet Take 1 Tablet by mouth every six (6) hours as needed for Itching for up to 10 days. , Print, Disp-30 Tablet, R-0             DISPOSITION:    Home With:   OT  PT  HH  RN       Long term SNF/Inpatient Rehab    Independent/assisted living    Hospice    Other:       PATIENT CONDITION AT DISCHARGE:     Functional status    Poor     Deconditioned     Independent      Cognition     Lucid     Forgetful     Dementia      Catheters/lines (plus indication)    Alcantar     PICC     PEG     None      Code status     Full code     DNR      PHYSICAL EXAMINATION AT DISCHARGE:  General:          Alert, cooperative, no distress, appears stated age.      HEENT:           Atraumatic, anicteric sclerae, pink conjunctivae                          No oral ulcers, mucosa moist, throat clear, dentition fair  Neck:               Supple, symmetrical  Lungs:             Clear to auscultation bilaterally. No Wheezing or Rhonchi. No rales. Chest wall:      No tenderness  No Accessory muscle use. Heart:              Regular  rhythm,  No  murmur   No edema  Abdomen:        Soft, non-tender. Not distended. Bowel sounds normal  Extremities:     No cyanosis. No clubbing,                            Skin turgor normal, Capillary refill normal  Skin:                Not pale. Not Jaundiced  No rashes   Psych:             Not anxious or agitated. Neurologic:      Alert, moves all extremities, answers questions appropriately and responds to commands       Recent Results (from the past 24 hour(s))   CBC W/O DIFF    Collection Time: 04/08/22  4:15 AM   Result Value Ref Range    WBC 6.1 3.6 - 11.0 K/uL    RBC 4.65 3.80 - 5.20 M/uL    HGB 12.6 11.5 - 16.0 g/dL    HCT 38.4 35.0 - 47.0 %    MCV 82.6 80.0 - 99.0 FL    MCH 27.1 26.0 - 34.0 PG    MCHC 32.8 30.0 - 36.5 g/dL    RDW 17.0 (H) 11.5 - 14.5 %    PLATELET 521 922 - 275 K/uL    MPV 10.1 8.9 - 12.9 FL    NRBC 0.0 0  WBC    ABSOLUTE NRBC 0.00 0.00 - 4.43 K/uL   METABOLIC PANEL, COMPREHENSIVE    Collection Time: 04/08/22  4:15 AM   Result Value Ref Range    Sodium 139 136 - 145 mmol/L    Potassium 3.4 (L) 3.5 - 5.1 mmol/L    Chloride 107 97 - 108 mmol/L    CO2 27 21 - 32 mmol/L    Anion gap 5 5 - 15 mmol/L    Glucose 122 (H) 65 - 100 mg/dL    BUN 10 6 - 20 MG/DL    Creatinine 0.75 0.55 - 1.02 MG/DL    BUN/Creatinine ratio 13 12 - 20      GFR est AA >60 >60 ml/min/1.73m2    GFR est non-AA >60 >60 ml/min/1.73m2    Calcium 8.5 8.5 - 10.1 MG/DL    Bilirubin, total 7.2 (H) 0.2 - 1.0 MG/DL    ALT (SGPT) 1,018 (H) 12 - 78 U/L    AST (SGOT) 377 (H) 15 - 37 U/L    Alk.  phosphatase 291 (H) 45 - 117 U/L    Protein, total 6.6 6.4 - 8.2 g/dL    Albumin 2.9 (L) 3.5 - 5.0 g/dL Globulin 3.7 2.0 - 4.0 g/dL    A-G Ratio 0.8 (L) 1.1 - 2.2       CHRONIC MEDICAL DIAGNOSES:  Problem List as of 4/8/2022 Never Reviewed          Codes Class Noted - Resolved    Abdominal pain ICD-10-CM: R10.9  ICD-9-CM: 789.00  4/6/2022 - Present              Greater than 45 minutes were spent with the patient on counseling and coordination of care    Signed:   Lisa Benitez MD  4/8/2022  4:19 PM

## 2022-04-08 NOTE — PROGRESS NOTES
Patient is restless, constantly unhooking self from IV and tele monitor, and walking out of room, when redirected she would go back but keeps doing it. She had scheduled Ativan at 2157. Now she claims she is scheduled for Suboxone 2x daily, but order states 1x daily. Also that she didn't have her dose for today and she feels she is going into withdrawal. Day shift RN says they found the pill on the floor that was partially dissolved hours after administration. On-call [employer] and one time order for Ativan 1mg put in and administered. Patient in bed at this time.  Will continue to monitor

## 2022-04-08 NOTE — PROGRESS NOTES
RN was in room drawing labs and getting an IV access for the second patient and on coming out, was inform that patient had left room and was found downstairs. Patient know how to turn bed alarm off and is constantly doing that, as well as disconnecting self from IV tubing. Patient had gotten out of room multiple times that RN had to seat at the station new patient's room. Patient was then moved to the room nearest to the nursing station.

## 2022-04-08 NOTE — PROGRESS NOTES
[address] [facility] Adult  Hospitalist Group                                                                                          Hospitalist Progress Note  Jailyn Hill MD  Answering service: [phone] from in house phone        Date of Service:  2022  NAME:  Ria Mendez  :  1985  MRN:  [id number]      Admission Summary:     Ria Mendez is a 39 y.o. female who presents with abdominal pain. Patient with history of IV drug abuse, she has been at [facility], started on Suboxone, had another dose of Suboxone this morning, patient came to the ER because she was having some abdominal pain associated with nausea and poor appetite, reports that the pain has gotten worse since he stopped using her heroin, denies any other complaints or problems, patient came to the ER was found to have elevated LFTs and was requested to be admitted to the hospitalist service     Interval history / Subjective:     She said she feels better. Eating well, no nausea or vomiting  Patient denied smoking in the room. I told her she can't smoke in the hospital      Assessment & Plan:     Abdominal pain and Elevated liver enzyme due to acute hepatitis A   -liver enzyme improving, bilirubin trending up  -US echogenic portal triads, raising a question of periportal edema, a nonspecific finding, versus hepatitis, Gallbladder contraction with wall thickening likely on that basis. No biliary  ductal dilatation.   -hepatitis A IgM reactive, hepatitis B and C negative  -monitor liver enzyme   -GI is consulted    Heroin withdrawal  -will resume home soboxone   -continue normal saline, ativan prn   -blood cx  -Echo  -consult to psychiatrist    UTI POA  -on iv ceftriaxone and IVF   -urine cx mixed stanislaw    Hypokalemia   -replaced and resolved          Code status: Full Code  Prophylaxis: SCD  Care Plan discussed with: Patient, nurse and CM  Anticipated Disposition: Home, >48 hr     Hospital Problems  Never Reviewed          Codes Class Noted POA    Abdominal pain ICD-10-CM: R10.9  ICD-9-CM: 789.00  4/6/2022 Unknown                 Vital Signs:    Last 24hrs VS reviewed since prior progress note. Most recent are:  Visit Vitals  BP (!) 158/103   Pulse 86   Temp 97.7 °F (36.5 °C)   Resp (!) 32   Ht 5' 5\" (1.651 m)   Wt 50.5 kg (111 lb 4.8 oz)   SpO2 95%   BMI 18.52 kg/m²         Intake/Output Summary (Last 24 hours) at 4/8/2022 1544  Last data filed at 4/8/2022 1230  Gross per 24 hour   Intake 1240 ml   Output 1 ml   Net 1239 ml        Physical Examination:     I had a face to face encounter with this patient and independently examined them on 4/8/2022 as outlined below:          Constitutional:  No acute distress, cooperative, pleasant    ENT:  Oral mucosa moist, oropharynx benign. Resp:  CTA bilaterally. No wheezing/rhonchi/rales. No accessory muscle use. CV:  Regular rhythm, normal rate, no murmurs, gallops, rubs    GI:  Soft, non distended, non tender. normoactive bowel sounds, no hepatosplenomegaly     Musculoskeletal:  No edema     Neurologic:  Moves all extremities. AAOx3, CN II-XII reviewed            Data Review:    Review and/or order of clinical lab test  Review and/or order of tests in the radiology section of CPT  Review and/or order of tests in the medicine section of CPT      Labs:     Recent Labs     04/08/22  0415 04/07/22  1327   WBC 6.1 5.8   HGB 12.6 12.3   HCT 38.4 37.2    278     Recent Labs     04/08/22  0415 04/07/22  0608 04/06/22  1226    142 139   K 3.4* 3.8 3.3*    109* 107   CO2 27 27 27   BUN 10 9 8   CREA 0.75 0.76 0.80   * 98 137*   CA 8.5 8.3* 8.9     Recent Labs     04/08/22  0415 04/07/22  0608 04/06/22  1226   ALT 1,018* 1,357* 1,889*   * 230* 275*   TBILI 7.2* 7.1* 6.5*   TP 6.6 6.2* 7.9   ALB 2.9* 2.7* 3.0*   GLOB 3.7 3.5 4.9*   LPSE  --   --  133     No results for input(s): INR, PTP, APTT, INREXT, INREXT in the last 72 hours.    No results for input(s): FE, TIBC, PSAT, FERR in the last 72 hours. No results found for: FOL, RBCF   No results for input(s): PH, PCO2, PO2 in the last 72 hours. No results for input(s): CPK, CKNDX, TROIQ in the last 72 hours.     No lab exists for component: CPKMB  No results found for: CHOL, CHOLX, CHLST, CHOLV, HDL, HDLP, LDL, LDLC, DLDLP, TGLX, TRIGL, TRIGP, CHHD, CHHDX  No results found for: GLUCPOC  Lab Results   Component Value Date/Time    Color DARK YELLOW 04/06/2022 01:10 PM    Appearance TURBID (A) 04/06/2022 01:10 PM    Specific gravity 1.018 04/06/2022 01:10 PM    pH (UA) 7.0 04/06/2022 01:10 PM    Protein 30 (A) 04/06/2022 01:10 PM    Glucose Negative 04/06/2022 01:10 PM    Ketone Negative 04/06/2022 01:10 PM    Urobilinogen 1.0 04/06/2022 01:10 PM    Nitrites Positive (A) 04/06/2022 01:10 PM    Leukocyte Esterase SMALL (A) 04/06/2022 01:10 PM    Epithelial cells MODERATE (A) 04/06/2022 01:10 PM    Bacteria 1+ (A) 04/06/2022 01:10 PM    WBC 0-4 04/06/2022 01:10 PM    RBC 0-5 04/06/2022 01:10 PM         Medications Reviewed:     Current Facility-Administered Medications   Medication Dose Route Frequency    naloxone (NARCAN) injection 0.4 mg  0.4 mg IntraVENous PRN    buprenorphine-naloxone (SUBOXONE) 8-2mg SL tablet  1 Tablet SubLINGual DAILY    nicotine (NICODERM CQ) 21 mg/24 hr patch 1 Patch  1 Patch TransDERmal DAILY    hydrOXYzine HCL (ATARAX) tablet 25 mg  25 mg Oral Q6H PRN    sodium chloride (NS) flush 5-40 mL  5-40 mL IntraVENous Q8H    sodium chloride (NS) flush 5-40 mL  5-40 mL IntraVENous PRN    0.9% sodium chloride infusion  100 mL/hr IntraVENous CONTINUOUS    ondansetron (ZOFRAN) injection 4 mg  4 mg IntraVENous Q4H PRN    cefTRIAXone (ROCEPHIN) 1 g in sterile water (preservative free) 10 mL IV syringe  1 g IntraVENous Q24H    LORazepam (ATIVAN) injection 1 mg  1 mg IntraVENous Q8H PRN    melatonin tablet 3 mg  3 mg Oral QHS PRN ______________________________________________________________________  EXPECTED LENGTH OF STAY: 3d 4h  ACTUAL LENGTH OF STAY:          2                 Chester Barker MD

## 2022-04-08 NOTE — PROGRESS NOTES
Problem: Patient Education:  Go to Education Activity  Goal: Patient/Family Education  Outcome: Progressing Towards Goal     Problem: Patient Education: Go to Patient Education Activity  Goal: Patient/Family Education  Outcome: Progressing Towards Goal     Problem: Patient Education: Go to Patient Education Activity  Goal: Patient/Family Education  Outcome: Progressing Towards Goal     Problem: Nausea/Vomiting (Adult)  Goal: *Absence of nausea/vomiting  Outcome: Progressing Towards Goal

## 2022-04-08 NOTE — PROGRESS NOTES
I have reviewed discharge instructions with the patient. The patient verbalized understanding. Pt is going to hospital's guest house. Pt has script for atarax handed to her. Pt has paperwork for discharge instructions. IV taken out of right arm. Pt belongings and clothing returned.

## 2022-04-08 NOTE — PROGRESS NOTES
Patient noted walking out of room rapidly with eyes closed and tried to open room 648 and was stopped by RN and patient walked back and got in bed, still with eyes closed

## 2022-04-08 NOTE — PROGRESS NOTES
Transition of Care  1. RUR 8% Low  2. Disposition Patient DC to [facility] until [place] to return home to [place] once trip confirmed  3. DME None  4. Transportation NA  5. Follow Up PCP, Specialist    CM notified by attending patient is DC today. CM met with patient who states that she is not returning to the [facility] as previously planned. She states that her family is currently assisting her with purchasing a bus/train ticket back home to [place]. Patient's daughter has already left [place] heading back to [place] by car. Patient will need ride assistance to address where she will be leaving i.e. bus or train station. CM updated nurse who will reach out to [facility] staff with further info once patient confirms her ticket has been purchased. Cain Linton MS    5:00 Patient reports her family has been unsuccessful in getting her a bus ticket home to [place]. CM completed referral to the [facility] and placed follow up call [phone]. CM spoke with Abundio Liz who states that they would be willing to accept patient however they do require a photo ID and patient has lost her IDJeannie Fletcher to follow up with manager and return call to CM. Cain Linton MS    5:18 Justine with the [facility] reports that the patient has been approved to stay through Monday April 11, 2022. Patient will continue to work with family to secure a trip back home to [place].      Cain Linton MS

## 2022-04-08 NOTE — PROGRESS NOTES
Physician Progress Note      Doyle Chaves  CSN #:                  [id number]  :                       1985  ADMIT DATE:       2022 12:25 PM  [address] [place] DATE:  RESPONDING  PROVIDER #:        Eufemia Torres MD          QUERY TEXT:    Patient admitted with abd pain and elevated LFT noted to have REACTIVE hepatitis A IgM results abd REACTUVE! Hep C virus Ab Interp. If possible, please document in progress notes and discharge summary if you are evaluating and/or treating any of the following, please consider all possible acute or chronic options for Hepatitis and provide your own response if the response options provided are not reflective of the chronicity, if any, of the conditions being treated: The medical record reflects the following:  Risk Factors: 36F hx of IVDU, UDS + cocaine  Clinical Indicators:    22 1226 Albumin 3.0 Bilirubin, total 6.5 Globulin 4.9 ALT 1889 AST 1450 Alk Phos 275  22 0608 Albumin 2.7 bilirubin. total 7.1, direct 5.8 Globulin 3.5 ALT 1357  Alk phos 230     Hepatitis Panel, Acute  Hepatitis A  IgM REACTIVE! Hepatitis C virus Ab Index >11.0  Hep C virus Ab Interp. REACTIVE!     Attending Progress Notes  Abdominal pain and Elevated liver enzyme unclear etiology  -liver enzyme improving  -US echogenic portal triads, raising a question of periportal edema, a nonspecific finding, versus hepatitis, Gallbladder contraction with wall thickening likely on that basis. No biliary  ductal dilatation.   -hepatitis A IgM reactive, hepatitis B and C negative  -monitor liver enzyme  -GI is consulted    Treatment: IVF, Zofran IV and ODT prn, GI Consult, RD Consult Labs, Contact Precautions    Thank you    Satnam CABALLERON RN [facility]  Clinical Documentation Improvement Specialist  [phone]  Options provided:  -- Acute Hepatitis A with acute hepatic failure  -- Acute Hepatitis A  and C with acute hepatic failure  -- Acute Hepatitis A Acute Hepatitis C  -- Labs not clinically significant  -- Other - I will add my own diagnosis  -- Disagree - Not applicable / Not valid  -- Disagree - Clinically unable to determine / Unknown  -- Refer to Clinical Documentation Reviewer    PROVIDER RESPONSE TEXT:    Abdominal pain and Elevated liver enzyme due to acute hepatitis A    Query created by: Jazz Bardales on 4/7/2022 1:10 PM      QUERY TEXT:    Patient admitted with abd pain. RD was consulted. If possible, please document in progress notes and discharge summary if you are evaluating and /or treating any of the following: The medical record reflects the following:  Risk Factors: 36F hx IVDU UDS + cocaine reactive acute hepatitis panel  Clinical Indicators:    4/7 Landy Mora RD Consult  MD Consult recieved for weight loss  No height or weight charted  Pt stated her height is 5'5\" and guesses she weighs about 100lbs now. Dora Garcia Dora Garcia \"afriad to know\" exactly how much she weighs Reports UBW 155lbs last weighing that six months ago  This indicates 35% weight loss x6 months  Malnutrition Assessment:  Malnutrition Status:  Severe malnutrition  Context:  Chronic illness  Findings of the 6 clinical characteristics of malnutrition:  Energy Intake:  7 - 75% or less est energy requirements for 1 month or longer  Weight Loss:  7.00 - Greater than 10% over 6 months  Body Fat Loss:  7 - Severe body fat loss, Buccal region,Orbital  Muscle Mass Loss:  7 - Severe muscle mass loss, Temples (temporalis)  Fluid Accumulation:  No significant fluid accumulation,   Strength:  Not performed      Treatment: RD Consult, Psych Consult, Continue Suboxone, Measure Height and Weight, Add Ensure Enlive (high calorie high proteins) TID, Add Boost Pudding and Magic Cup x1/day each, recommend ordering daily MVI, continue GI bland diet    ASPEN Criteria:  https://aspenjournals. onlinelibrary. hastings. com/doi/full/10.1177/1088958333709318      Thank you    Hernandez CRUZ RN [facility]  Clinical Documentation Improvement Specialist  [phone]  Options provided:  -- Protein calorie malnutrition mild  -- Protein calorie malnutrition moderate  -- Protein calorie malnutrition severe  -- Other - I will add my own diagnosis  -- Disagree - Not applicable / Not valid  -- Disagree - Clinically unable to determine / Unknown  -- Refer to Clinical Documentation Reviewer    PROVIDER RESPONSE TEXT:    This patient has mild protein calorie malnutrition.     Query created by: Bridgette Lim on 4/7/2022 1:14 PM      Electronically signed by:  Gilma Floyd MD 4/7/2022 9:10 PM

## 2022-04-09 NOTE — DISCHARGE INSTRUCTIONS
Discharge Instructions       PATIENT ID: Murtaza Garcia  MRN: [id number]   YOB: 1985    DATE OF ADMISSION: 4/6/2022 12:25 PM    DATE OF DISCHARGE: 4/8/2022    PRIMARY CARE PROVIDER: None     ATTENDING PHYSICIAN: Negro Mendieta MD  DISCHARGING PROVIDER: Catracho Jimenez MD    To contact this individual call [phone] and ask the  to page. If unavailable ask to be transferred the Adult Hospitalist Department. DISCHARGE DIAGNOSES   Acute hepatitis A   Heroin withdrawal  UTI POA  Hypokalemia     CONSULTATIONS: IP CONSULT TO GASTROENTEROLOGY  IP CONSULT TO PSYCHIATRY    PROCEDURES/SURGERIES: * No surgery found *    PENDING TEST RESULTS:   At the time of discharge the following test results are still pending: None    FOLLOW UP APPOINTMENTS:   Follow-up Information     Follow up With Specialties Details Why Contact Info    Primary Care Physicians   In one week  None ([phone]) Patient stated that they have no PCP        Advised to go to [facility] [address]  aKron Cadet MD, Gastroenterologist in 2 weeks    ADDITIONAL CARE RECOMMENDATIONS:      DIET: Regular Diet     ACTIVITY: Activity as tolerated    WOUND CARE: None    EQUIPMENT needed: None      DISCHARGE MEDICATIONS:   See Medication Reconciliation Form    · It is important that you take the medication exactly as they are prescribed. · Keep your medication in the bottles provided by the pharmacist and keep a list of the medication names, dosages, and times to be taken in your wallet. · Do not take other medications without consulting your doctor. NOTIFY YOUR PHYSICIAN FOR ANY OF THE FOLLOWING:   Fever over 101 degrees for 24 hours. Chest pain, shortness of breath, fever, chills, nausea, vomiting, diarrhea, change in mentation, falling, weakness, bleeding. Severe pain or pain not relieved by medications. Or, any other signs or symptoms that you may have questions about.       DISPOSITION:    Home With:   OT PT  [facility]  RN       SNF/Inpatient Rehab/LTAC   x Independent/assisted living    Hospice    Other:     CDMP Checked:   Yes x     PROBLEM LIST Updated:  Yes x       Signed:   Gregg Simmons MD  4/8/2022  4:17 PM

## 2022-04-12 LAB
BACTERIA SPEC CULT: NORMAL
SERVICE CMNT-IMP: NORMAL